# Patient Record
(demographics unavailable — no encounter records)

---

## 2018-01-01 NOTE — NICUPPNE
===========================

Datetime: 2018 13:56

===========================

   

NICU Prov Infect Disease:  No prolonged ROM, but Ampicillin and Gentamicin were started due to respir
atory distress.

   WBC count today 27.7k up from 9.5 yesterday

   Blood c/s No Growth X 24 hrs

   Continue antibiotics + rpt CBC tomorrow _ cont to follw WBC count

## 2018-01-01 NOTE — NBADN
===========================

Datetime: 2018 11:25

===========================

   

Nsy Prov Gen Appearance:  Notable

Nsy Prov Gen Appearance:  Notable

Nsy Prov Skin:  Within Normal Limits

Nsy Prov Neuro:  Normal Tone; [place]; Grasp; Root; Suck

Nsy Prov Musculoskeletal:  Within Normal Limits; Full Range of Motion; Spontaneous Movement All Extre
mities; Intact Clavicles; Clavicles without Crepitus; Gluteal Folds Symmetrical; Spine Within Normal 
Limits; No Sacral Dimple/Cyst

Nsy Prov Head:  Normal Fontanelles; Normocephalic; Sutures WNL

Nsy Prov EENT:  Mouth Within Normal Limits; Ears Within Normal Limits; Eyes Within Normal Limits; Eye
s Red Reflex Bilaterally; Nose Within Normal Limits; Face Within Normal Limits

Nsy Prov Cardiovascular:  Within Normal Limits; Normal Pulses

Nsy Prov Respiratory:  Grunting; Tachypneic

Nsy Prov GI:  Within Normal Limits; Soft; Normal Liver; Non Palpable Spleen; Patent Anus

Nsy Prov Umbilicus:  Within Normal Limits; Three Vessel Cord

Nsy Prov Gen Appearance Details:  Large for GA.

Nsy Prov Impression/Plan Details:  Late  (35+5 w GA) male NB by emergency CS for cord prolapse
.

   Respiratory distress.  The distress (grunting and tachypnea and mild retractions) worsened in nurs
nadeen.

   Mother's GBS status unknown. 

   Baby was transferred to NICU (RD, R/O sepsis, LGA NB).

Nsy Prov Laboratory:  Accucheck.CBC. BCX. CBG. CXR.

   

===========================

Datetime: 2018 10:46

===========================

   

NICU Prov Admission Mat Hx:  Late transfer from [facility], no prenatals currently available
.

Mother's Rule Inc Maternal Age:  Age >=35 at MUNDO not specified

Mother's Rule Thalassemia:  Thalassemia History not specified

Mother's Rule Neural Tube Defect:  Neural Tube Defect History not specified

Mother's Rule Congenital Heart:  Congenital Heart Defect not specified

Mother's Rule Down Syndrome:  Down Syndrome History not specified

Mother's Rule Sagar-Sachs:  Sagar-Sachs History not specified

Mother's Rule Canavan:  Canavan History not specified

Mother's Rule Familial Dysauto:  Familial Dysautonomia History not specified

Mother's Rule Sickle Cell:  Sickle Cell Disease/Trait History not specified

Mother's Rule Hemophilia:  Hemophilia/Blood Disorder History not specified

Mother's Rule Muscular Dystrophy:  Muscular Dystrophy History not specified

Mother's Rule Cystic Fibrosis:  Cystic Fibrosis History not specified

Mother's Rule Galax's Chor:  Galax's Chorea History not specified

Mother's Rule Mental Retardation:  Mental Retardation/Autism History not specified

Mother's Rule Fragile X:  Fragile X Testing History not specified

Mother's Rule Oth Inherited DO:  Other Inherited/Chromosomal Disorders not specified

Mother's Rule Maternal Metabolic:  Maternal Metabolic History not specified

Mother's Rule FOB Birth Defects:  Pt Father or FOB Birth Defect History not specified

Mother's Rule Hx Stillborn MBL:  Loss/Stillborn History not specified

Mother's Rule Other Genetic Hx:  Other Genetic History not specified

Mother's Rule Drugs/Medications:  Drugs/Medications History not specified

Mother's Rule Gonorrhea:   Gonorrhea History Not Specified

Mother's Rule Chlamydia:  Chlamydia History not specified

Mother's Rule Syphilis:  Syphilis History not specified

Mother's Rule HIV/AIDS Exp:  HIV/Aids Exposure not specified

Mother's Rule HPV:  Human Papillomavirus History not specified

Mother's Rule Genital Herpes:  Genital Herpes not specified

Mother's Rule TB:  Tuberculosis History not specified

Mother's Rule Hepatitis:  Hepatitis History Not Specified

Mother's Rule Rash or Viral Ill:  Rash or Viral Illness History not specified

Mother's Rule Diabetes:  Diabetes History not specified

Mother's Rule Hypertension MBL:  History of Hypertension Not Specified

Mother's Rule Heart Disease:  Heart Disease History not specified

Mother's Rule Autoimmune:  Autoimmune Disorder History not specified

Mother's Rule Kidney Disease:  History of Kidney Disease/UTI not specified

Mother's Rule Neurologic:  Neurologic/Epilepsy Disorders not specified

Mother's Rule Psych Disorders:  Psychiatric Disorder History not specified

Mother's Rule Depression/PP Dep:  Depression/Postpartum Depression History not specified

Mother's Rule Hepaitis/tLiver:  History of Hepatitis/Liver Disease not specified

Mother's Rule Varicos/Phlebitis:  Varicosities/Phlebitis History Not Specified

Mother's Rule Thyroid Dysfunct:  Thyroid Dysfunction not specified

Mother's Rule Trauma/Violence:  Trauma/Violence History Not Specified

Mother's Rule Blood Transfusion:  Blood Transfusion History not specified

Mother's Rule Sensitization:  D (Rh) Sensitization not specified

Mother's Rule Pulmonary:  Pulmonary (Asthma, TB) History not specified

Mother's Rule Breast:  Breast History not specified

Mother's Rule Gyn Surgery:  Gyn Surgery Hx not specified

Mother's Rule Hosp/Surgery:  Hospitalization/Surgery History not specified

Mother's Rule Anesthetic Comp:  Anesthetic Complications Hx not specified

Mother's Rule Abnormal Pap:  Abnormal Pap Smear not specified

Mother's Rule Uterine Anomaly:  Uterine Anomaly/SARIKA not specified

Mother's Rule Infertility:  Infertility Not Specified

Mother's Rule ART Treatment:  ART Treatment History not specified

Mother's Rule Other Med Disease:  Other Medical Diseases History not specified

Mother's Rule Family History:  Significant Family History not specified

   

===========================

Datetime: 2018 09:56

===========================

   

Method of Delivery:  

Infant Birthdate and Time:  2018 07:33

Gestational Age at Deliv:  35.5

Infant Sex - 1:  Male

Fetal Presentation:  Breech

Apgar Score 1, NB:  8

Apgar Score5, NB:  9

Mother's PT-AGE:  27

Mother's :  1

Mother's Para:  0

Mother's :  0

Mother's Abortions Induced:  0

Mother's Abortions Sponteneous:  0

Mother's Livin

Mother's Primary Language MBL:  Citizen of [place]; Castilian

Mother's Blood Type:  O POS

Mother's Term:  0

Admission Birthweight, NB:  3325

Infant Weight (lb) MBL:  7

Infant Weight (oz) MBL:  5

Mother's Delivery Anesthesia:  General

Infant Cord Vessels:  3

Mother's Marital Status:  SINGLE

   

===========================

Datetime: 2018 07:55

===========================

   

Admit From NB:  Operating Room

Admit Date and Time, NB:  2018 07:55

Weight Admission (gms), NB:  3325

Weight Admission (lbs), NB:  7

Weight Admission (oz) NB:  5

Length Admission (in), NB:  19.68

Head Circumference Adm (cm), NB:  34.50

Head circumference Adm (in), NB:  13.58

Chest Circumference Adm (cm), NB:  33.00

Abdominal Circumference Adm (cm):  32.00

Length Admission (cm), NB:  50.00

## 2018-01-01 NOTE — DELATT
===========================

Datetime: 2018 10:46

===========================

   

Del Note Departure Status:   Nursery

Del Note Status:  Late  (35+5 w GA) male NB by emergency CS for cord prolapse.

   Respiratory distress.

Del Note Interventions Oth:  Called by DR. Hughes for delivery attendance.

      

   Delivery done by emergency CS under general anesthesia after cord prolapse happened after admissio
n.

      

   Baby cried spontaneously after birth with HR > 100.

      

   Developed grunting soon after after birth.

   O2 given by blow by, then CPAP applied and continued in nursery then in NICU.

      

Del Note Interventions:  Assessment; Stimulation; Drying; Blow By Oxygen; CPAP; Suction Upper Airway

DENZEL/NICU Del Atten Note Adm DT:  2018 11:25

   

===========================

Datetime: 2018 09:56

===========================

   

Apgar Score 1, NB:  8

Apgar Score5, NB:  9

Resuscitation Effort 5 MBL:  N/A

## 2018-01-01 NOTE — NICUPPNE
===========================

Datetime: 2018 10:36

===========================

   

Type of Note:  Admission Note

NICU Prov Vital Signs:  All Reviewed

NICU Prov Vital Signs Details:  36 week LGA presented to L_D with possible bleeding and had cord prol
apse.  Required PPV and CPAP via TPiece in DR by pediatrician. Admitted to level 2 nursery with tachy
pnea and mild GFR.

NICU Prov Lab Review:  All Reviewed

NICU Prov Lab Review Details:  blood gas with respiratory and metabolic acidosis.

NICU Resp Effort Prov:  Tachypneic; Nasal Flaring; Retractions; Grunting

NICU Breath Sounds Prov:  Coarse

NICU Thorax Prov:  Normal

NICU Resp Support Prov:  CPAP

NICU Prov Respiratory:  Pulse oximeter 93%, CXR RDS picture will await official report.  

      

   Plan: Continue NCPAP and O2 as needed.  If worsenint RDS may require transfer to level 3 nursery f
or surfactant and mechanical ventilation.  Will repeat blood gas and monitor O2 requirement.

NICU Heart Prov:  Strong Regular Beat

NICU Precordium Prov:  Quiet

NICU Pulses Prov:  Pulses Equal in all Four Extremities

NICU Cap Refill Prov:  Brisk -Less than 3 seconds

NICU Edema Prov:  None

NICU Prov Cardiac Issues:  No Active Issues

NICU Abdomen Prov:  Soft; Flat

NICU Bowel Sounds Prov:  Present

NICU Liver Prov:  Within Normal Limits

NICU Bladder Prov:  Non Palpable

NICU Genitalia Prov:  Normal Male

NICU Anus Prov:  Patent

NICU Prov GI/ Issues:  No Active Issues

NICU Prov Fl/Nutr Intake:  100.00

NICU Prov Fl/Nutr PO:  0

NICU Prov Fl/Nutr Lines:  Peripheral IV

NICU Prov Fl/Nutr Feed Method:  NPO

NICU Prov Fluid/Nutrition Issues:  No Active Issues

NICU Prov Fluid/Nutrition:  NPO due to respiratory distress. On IVF D10W TF 

NICU Phototherapy Prov:  None

NICU Prov Hematology Issues:  No Active Issues

NICU Prov Hematology:  Monitor for juandice.

NICU Skin Prov:  Within Normal Limits

NICU Skin Turgor Prov:  Elastic

NICU Clavicles Prov:  Within Normal Limits

NICU Extremities Prov:  Within Normal Limits

NICU Spine Prov:  Within Normal Limits

NICU Hip Prov:  Full Range of Motion

NICU Prov Skin/MusSkel Issues:  No Active Issues

NICU Activity Prov:  Quiet Alert

NICU Reflexes Prov:  Appropriate for Gestational Age

NICU Cry Prov:  Appropriate

NICU Tone Prov:  Appropriate

NICU Prov Neuro/Develop Issues:  No Active Issues

NICU Scalp Prov:  Within Normal Limits

NICU Fontanelles Prov:  Soft; Flat

NICU Sutures Prov:  Approximated

NICU Face Prov:  Within Normal Limits

NICU Ears Prov:  Symmetrical

NICU Eyes Prov:  Normal Shape and Size

NICU Mouth Prov:  Within Normal Limits

NICU Nose Prov:  Within Normal Limits

NICU Prov HEENT Issues:  No Active Issues

NICU Prov HEENT:  Red reflex exam deffered due to eye ointment placed.

NICU Prov Infect Disease:  No prolonged ROM, but will start Ampicillin and Gentamicin due to respirat
ory distress.

NICU Prov Genetics Issue:  No Active Issues

## 2018-01-01 NOTE — NICUPPNE
===========================

Datetime: 2018 11:54

===========================

   

Type of Note:  Admission Note

NICU Prov Vital Signs:  Last 24 Hours Reviewed

NICU Prov Vital Signs Details:  Day 2 of life for this 36 week LGA infant born to a 26 yo  SNR, H
BsAg(-), Rub Nom-Imm mother who presented to L_D with possible bleeding and had cord prolapse.  Requi
red PPV and CPAP via TPiece in DR by pediatrician. Admitted to level 2 nursery with tachypnea and mil
d GFR. Taken off CPAP later on , his respiratory status has improved _ feeds were advanced now to
 Ad zahra feeds _ weaning IV fluid.

NICU Prov Lab Review:  Last 24 Hours Reviewed; All Reviewed

NICU Resp Effort Prov:  Normal Respirations

NICU Breath Sounds Prov:  Clear and Equal Bilaterally

NICU Thorax Prov:  Normal

NICU Resp Support Prov:  CPAP

NICU Prov Respiratory:  s/p CPAP on  - in Room air since

   Pulse oximeter %, RR 30-56 today down from yesterday

   CXR on admission c/w RDS.   

      

   Plan: Continue to Monitor Respiratory status.

NICU Heart Prov:  Strong Regular Beat

NICU Precordium Prov:  Quiet

NICU Cap Refill Prov:  Brisk -Less than 3 seconds

NICU Edema Prov:  None

NICU Prov Cardiac Issues:  No Active Issues

NICU Abdomen Prov:  Soft; Flat

NICU Bowel Sounds Prov:  Present

NICU Spleen Prov:  Within Normal Limits

NICU Liver Prov:  Within Normal Limits

NICU Genitalia Prov:  Normal Male

NICU Prov GI/ Issues:  No Active Issues

NICU Prov Fl/Nutr Intake:  100.00

NICU Prov Fl/Nutr Lines:  Peripheral IV

NICU Prov Fl/Nutr Feed Method:  PO

NICU Prov Fluid/Nutrition Issues:  No Active Issues

NICU Prov Fluid/Nutrition:  Initially NPO due to respiratory distress. On IVF D10W. Feeds started 6/2
7 with Breast Milk 6 ml q 3 hrs advancing by 3 ml q feed. Advanced to Ad zahra feeds last night.   Void
ing _ stooling.

   SMA7 this morning Na 134  K 5.1  Cl104  Bicrb 20 BUN/Cr 9/0.8/1  Ca 8.6     Accuchecks = 81, 73, 8
1 mg/dl Na _ Ca improved from yesterday.

   If the Accucheck remains > 60 mg/dl will discontinue IV fluid

      

NICU Bilirubin Prov:  Bilirubin Values Reviewed

NICU Phototherapy Prov:  None

NICU Prov Hematology Issues:  No Active Issues

NICU Prov Hematology:  Mother O+ Baby O+/Lulu(-)    Bilirubin  7.6/0 yesterday--> 10/0.6 this morni
ng

   CBC this morning 23.8>18.7/53.7<233k Plts    

   WBC Count down from 27.7k yesterday

   Continue to monitor bilirubin levels.

NICU Skin Prov:  Within Normal Limits

NICU Skin Turgor Prov:  Elastic

NICU Prov Skin/MusSkel Issues:  No Active Issues

NICU Activity Prov:  Quiet Alert

NICU Reflexes Prov:  Appropriate for Gestational Age

NICU Cry Prov:  Appropriate

NICU Tone Prov:  Appropriate

NICU Prov Neuro/Develop Issues:  No Active Issues

NICU Scalp Prov:  Within Normal Limits

NICU Fontanelles Prov:  Soft; Flat

NICU Sutures Prov:  Approximated

NICU Face Prov:  Within Normal Limits

NICU Ears Prov:  Symmetrical

NICU Eyes Prov:  Normal Shape and Size; Red Reflex Equal Bilaterally

NICU Mouth Prov:  Within Normal Limits

NICU Nose Prov:  Within Normal Limits

NICU Prov HEENT Issues:  No Active Issues

NICU Prov Infect Disease:  No prolonged ROM, but Ampicillin and Gentamicin were started due to respir
atory distress.

   WBC count today 23.8k down from 27.7 yesterday

   Blood c/s No Growth X 48 hrs

   Discontinue antibiotics _ follow clinically.

NICU Prov Genetics Issue:  No Active Issues

NICU Social Support Prov:  Mother

NICU Social Interactions Prov:  Visiting

NICU Social Actions Prov:  Update Given

NICU Prov Social:  Updated mother at the bedside.

## 2018-01-01 NOTE — NBDCN
===========================

Datetime: 2018 16:28

===========================

   

Nsy Prov Gen Appearance:  Notable

Nsy Prov Skin:  Within Normal Limits

Nsy Prov Neuro:  Normal Tone; [place]; Grasp; Root; Suck

Nsy Prov Musculoskeletal:  Within Normal Limits; Full Range of Motion; Spontaneous Movement All Extre
mities; Intact Clavicles; Clavicles without Crepitus; Gluteal Folds Symmetrical; Spine Within Normal 
Limits; No Sacral Dimple/Cyst

Nsy Prov Head:  Normal Fontanelles; Normocephalic; Sutures WNL

Nsy Prov EENT:  Mouth Within Normal Limits; Ears Within Normal Limits; Eyes Within Normal Limits; Eye
s Red Reflex Bilaterally; Nose Within Normal Limits; Face Within Normal Limits

Nsy Prov Cardiovascular:  Within Normal Limits; Normal Pulses

Nsy Prov Respiratory:  Within Normal Limits

Nsy Prov GI:  Within Normal Limits; Soft; Normal Liver; Non Palpable Spleen; Patent Anus

Nsy Prov Umbilicus:  Within Normal Limits; Three Vessel Cord

Nsy Prov :  Normal Male Genitalia

Nsy Prov HEENT Details:  tongue-tie

Nsy Prov Discharge:  Discharge Home Today; Vital Signs Appropriate; Bonding Appropriately; Voiding an
d Stooling; Appropriate Weight Loss; Follow Bilirubin Values

Nsy Prov Disch Comments:  Prterm +35 wks, c/s.Well.

   Jaundice, s/p phototherapy. D/c Bili. 5.7.

   S/P TTN and r/o sepsis.

   Plan of care discussed.

Follow up in Weeks NB:  2 days

   

===========================

Datetime: 2018 12:30

===========================

   

Hearing Screen Retest Result, NB:  Right Ear Pass; Left Ear Pass

Hearing Screen Status:  Hearing Screen Complete

Formula Type:  Similac Advance

   

===========================

Datetime: 2018 12:00

===========================

   

Bilirubin Serum NB:  2018 12:00

   

===========================

Datetime: 2018 08:00

===========================

   

Length cms, NB:  50.00

Length in, NB:  19.68

Head Circumference (cm), NB:  34.00

   

===========================

Datetime: 2018 09:14

===========================

   

Lab, Bilirubin Total Serum:  13.1 H

Peak Bilirubin Total Serum:  13.1

   

===========================

Datetime: 2018 16:00

===========================

   

Hearing Screen Result, NB:  Right Ear Pass; Left Ear Refer

   

===========================

Datetime: 2018 06:00

===========================

   

[place] Screenin2018 06:00

   

===========================

Datetime: 2018 08:00

===========================

   

Congenital Heart Screen:  Negative, Congenital Heart Screen Complete

   

===========================

Datetime: 2018 16:57

===========================

   

Hepatitis B Vaccine NB:  2018 00:00

   

===========================

Datetime: 2018 12:28

===========================

   

Infant Birthdate and Time:  2018 07:33

Infant Sex - 1:  Male

Gestational Age at Deliv:  35.5

Method of Delivery:  

Vacuum Extraction:  N/A

Forceps:  N/A

Mother's Steroids Given:  None

Apgar Score 1, NB:  8

Apgar Score5, NB:  9

Mother's Blood Type:  O POS

Mother's RPR/VDRL:  Nonreactive

Mother's HIV+ Exposure Test MBL:  Negative

Mother's Hx Herpes:  No

Admission Birthweight, NB:  3325

Infant Weight (lb) MBL:  7

Infant Weight (oz) MBL:  5

   

===========================

Datetime: 2018 11:25

===========================

   

Nsy Prov Gen Appearance Details:  Large for GA.

   

===========================

Datetime: 2018 10:46

===========================

   

Discharge Weight gms NB:  3175

Discharge Weight lbs NB:  7

Discharge Weight oz NB:  0

Blood Type:  O Positive

Lab, Direct Lulu:  Negative

Disch Follow Up With:  pmd

Follow up Appt with NB:  Office

   

===========================

Datetime: 2018 07:55

===========================

   

Chest Circumference, NB:  33.00

## 2018-01-01 NOTE — RAD
HISTORY:

36 weeker NB by CS with respiratory distress  



COMPARISON:

No prior.



TECHNIQUE:

Chest PA and lateral



FINDINGS:



LUNGS:

Normal inspiratory volume is appreciated and no pleural effusion is 

evident bilaterally.  Cardiovascular pattern appears unremarkable 

with a normal cardiothymic silhouette present.  Overall granular 

opacities in the bilateral lung fields suggests restore distress 

syndrome.



PLEURA:

No significant pleural effusion identified. No pneumothorax apparent.



CARDIOVASCULAR:

Normal.



OSSEOUS STRUCTURES:

No significant abnormalities.



VISUALIZED UPPER ABDOMEN:

Normal.



OTHER FINDINGS:

None.



IMPRESSION:

Findings most compatible with respiratory distress syndrome. Clinical 

and radiographic follow-up are advised.

## 2018-01-01 NOTE — NICUPPNE
===========================

Datetime: 2018 16:25

===========================

   

Type of Note:  Discharge Note

NICU Prov Vital Signs Details:  Day 3 of life for this 36 week LGA infant born to a 26 yo  SNR, H
BsAg(-), Rub Non-Immune mother who presented to L_D with possible bleeding and had cord prolapse.  Re
quired PPV and CPAP via TPiece in DR by pediatrician. Admitted to level 2 nursery with tachypnea and 
mild GFR. Taken off CPAP later on   and then on RA; ad zahra feeds _ off IV fluid. Started photothe
rapy . BW 3325 grams PW: 3225 grams

NICU Resp Effort Prov:  Normal Respirations

NICU Breath Sounds Prov:  Clear and Equal Bilaterally

NICU Thorax Prov:  Normal

NICU Resp Support Prov:  Room Air

NICU Prov Respiratory:  s/p CPAP on  - in Room air since

    

      

   Plan: Continue to Monitor Respiratory status.

NICU Heart Prov:  Strong Regular Beat

NICU Precordium Prov:  Quiet

NICU Cap Refill Prov:  Brisk -Less than 3 seconds

NICU Edema Prov:  None

NICU Prov Cardiac Issues:  No Active Issues

NICU Abdomen Prov:  Soft; Flat

NICU Bowel Sounds Prov:  Present

NICU Spleen Prov:  Within Normal Limits

NICU Liver Prov:  Within Normal Limits

NICU Genitalia Prov:  Normal Male

NICU Anus Prov:  Patent

NICU Prov GI/ Issues:  No Active Issues

NICU Prov Fl/Nutr Intake:  100.00

NICU Prov Fl/Nutr Feed Method:  PO

NICU Prov Fluid/Nutrition Issues:  No Active Issues

NICU Prov Fluid/Nutrition:  Off IVF  

   Now feeding ad zahra Sim advance and formula well

      

      

NICU Bilirubin Prov:  Bilirubin Values Reviewed

NICU Phototherapy Prov:  None

NICU Prov Hematology Issues:  No Active Issues

NICU Prov Hematology:  Mother O+ Baby O+/Lulu(-)    

   Bili :  10/0.6 

         :   12.5/ 0

   CBC  : WBC : 23.8>18.7/53.7<241k Plts    

   start phototherapy - 

   Continue to monitor bilirubin levels.

NICU Skin Prov:  Within Normal Limits; Jaundice

NICU Skin Turgor Prov:  Elastic

NICU Spine Prov:  Within Normal Limits

NICU Hip Prov:  Full Range of Motion

NICU Prov Skin/MusSkel Issues:  No Active Issues

NICU Activity Prov:  Quiet Alert; Active Alert

NICU Reflexes Prov:  Appropriate for Gestational Age

NICU Cry Prov:  Appropriate

NICU Tone Prov:  Appropriate

NICU Prov Neuro/Develop Issues:  No Active Issues

NICU Scalp Prov:  Within Normal Limits

NICU Fontanelles Prov:  Soft; Flat

NICU Sutures Prov:  Approximated

NICU Face Prov:  Within Normal Limits

NICU Ears Prov:  Symmetrical

NICU Eyes Prov:  Normal Shape and Size; Red Reflex Equal Bilaterally

NICU Mouth Prov:  Within Normal Limits

NICU Nose Prov:  Within Normal Limits

NICU Prov HEENT Issues:  No Active Issues

NICU Prov HEENT:  HC 33 cm 

NICU Prov Infect Disease:  No prolonged ROM, but Ampicillin and Gentamicin were started due to respir
atory distress.

   WBC count today 23.8k down from 27.7 yesterday

   Blood c/s No Growth X 3 days

   s/p antibiotics _ follow clinically.

NICU Prov Genetics Issue:  No Active Issues

NICU Social Support Prov:  Mother

NICU Social Interactions Prov:  Visiting

NICU Social Actions Prov:  Update Given

NICU Prov Social:  Updated mother of infants condition

## 2018-01-01 NOTE — NICUPPNE
===========================

Datetime: 2018 13:56

===========================

   

Type of Note:  Admission Note

NICU Prov Vital Signs:  All Reviewed

NICU Prov Vital Signs Details:  36 week LGAinfant born to a 26 yo  SNR, HBsAg(-), Rub Nom-Imm mot
her who presented to L_D with possible bleeding and had cord prolapse.  Required PPV and CPAP via TPi
abbe in DR by pediatrician. Admitted to level 2 nursery with tachypnea and mild GFR. Taken off CPAP la
 night, his respiratory status has improved _ feeds were started this morning.

NICU Prov Lab Review:  Last 24 Hours Reviewed

NICU Resp Effort Prov:  Normal Respirations

NICU Breath Sounds Prov:  Clear and Equal Bilaterally

NICU Thorax Prov:  Normal

NICU Resp Support Prov:  CPAP

NICU Prov Respiratory:  s/p CPAP on  - in Room air since

   Pulse oximeter %, RR 40s-60s today down from 80s yesterday

   CXR RDS picture will await official report.  

      

   Plan: Continue to Monitor Respiratory status.

NICU Heart Prov:  Strong Regular Beat

NICU Precordium Prov:  Quiet

NICU Cap Refill Prov:  Brisk -Less than 3 seconds

NICU Edema Prov:  None

NICU Prov Cardiac Issues:  No Active Issues

NICU Abdomen Prov:  Soft; Flat

NICU Bowel Sounds Prov:  Present

NICU Spleen Prov:  Within Normal Limits

NICU Liver Prov:  Within Normal Limits

NICU Genitalia Prov:  Normal Male

NICU Prov GI/ Issues:  No Active Issues

NICU Prov Fl/Nutr Intake:  100.00

NICU Prov Fl/Nutr PO:  0

NICU Prov Fl/Nutr Lines:  Peripheral IV

NICU Prov Fl/Nutr Feed Method:  NPO

NICU Prov Fluid/Nutrition Issues:  No Active Issues

NICU Prov Fluid/Nutrition:  Initially NPO due to respiratory distress. On IVF D10W. Feeds started thi
s moring with Breast Milk 6 ml q 3 hrs advancing by 3 ml q feed.   Voiding _ stooling.

   SMA7 this morning Na 129  K 5  Cl95  Bicrb 20  Gluc 48  BUN/Cr /  Ca 6.9     Accuchecks = 70 _ 
71 mg/dl

   Sodium _ Calcium added to the IV fluid will reduce the IV rate as feeds are advanced, Cont to foll
w accuchecks _ rpt lytes tomorrrow morning.

NICU Bilirubin Prov:  Bilirubin Values Reviewed

NICU Phototherapy Prov:  None

NICU Prov Hematology Issues:  No Active Issues

NICU Prov Hematology:  O+/Lulu(-)    Bilirubin this morning 7.6/0

   CBC this morning 27.7>18.4/53.6<230k Plts

   Continue to monitor bilirubin levels.

NICU Skin Prov:  Within Normal Limits

NICU Skin Turgor Prov:  Elastic

NICU Prov Skin/MusSkel Issues:  No Active Issues

NICU Activity Prov:  Quiet Alert

NICU Reflexes Prov:  Appropriate for Gestational Age

NICU Cry Prov:  Appropriate

NICU Tone Prov:  Appropriate

NICU Prov Neuro/Develop Issues:  No Active Issues

NICU Scalp Prov:  Within Normal Limits

NICU Fontanelles Prov:  Soft; Flat

NICU Sutures Prov:  Approximated

NICU Face Prov:  Within Normal Limits

NICU Ears Prov:  Symmetrical

NICU Eyes Prov:  Normal Shape and Size

NICU Mouth Prov:  Within Normal Limits

NICU Nose Prov:  Within Normal Limits

NICU Prov HEENT Issues:  No Active Issues

NICU Prov Infect Disease:  No prolonged ROM, but Ampicillin and Gentamicin were started due to respir
atory distress.

   WBC count today 27.7k

   Blood c/s No Growth X 24 hrs

NICU Prov Genetics Issue:  No Active Issues

NICU Social Support Prov:  Mother

NICU Social Interactions Prov:  Visiting

NICU Social Actions Prov:  Update Given

NICU Prov Social:  Updated mother at the bedside.